# Patient Record
Sex: MALE | Race: WHITE | URBAN - METROPOLITAN AREA
[De-identification: names, ages, dates, MRNs, and addresses within clinical notes are randomized per-mention and may not be internally consistent; named-entity substitution may affect disease eponyms.]

---

## 2018-05-10 ENCOUNTER — TELEPHONE (OUTPATIENT)
Dept: FAMILY MEDICINE CLINIC | Facility: CLINIC | Age: 28
End: 2018-05-10

## 2018-05-10 NOTE — TELEPHONE ENCOUNTER
Left detailed message on pt's cell that per Dr. Mary Vazquez he needs to schedule an appt for a physical.

## 2018-05-10 NOTE — TELEPHONE ENCOUNTER
Pt states that he is working on getting approved for bariatric surgery but needs a letter stating that he has no medical contraindications and that he can participate in an exercise program. Pt was informed that it has been years since he was seen and woul

## 2018-05-17 ENCOUNTER — OFFICE VISIT (OUTPATIENT)
Dept: FAMILY MEDICINE CLINIC | Facility: CLINIC | Age: 28
End: 2018-05-17

## 2018-05-17 VITALS
DIASTOLIC BLOOD PRESSURE: 78 MMHG | HEART RATE: 88 BPM | HEIGHT: 76 IN | TEMPERATURE: 99 F | SYSTOLIC BLOOD PRESSURE: 130 MMHG | BODY MASS INDEX: 38.36 KG/M2 | WEIGHT: 315 LBS

## 2018-05-17 DIAGNOSIS — E66.01 CLASS 3 SEVERE OBESITY DUE TO EXCESS CALORIES WITHOUT SERIOUS COMORBIDITY WITH BODY MASS INDEX (BMI) OF 60.0 TO 69.9 IN ADULT (HCC): ICD-10-CM

## 2018-05-17 DIAGNOSIS — Z00.00 HEALTH CARE MAINTENANCE: Primary | ICD-10-CM

## 2018-05-17 PROCEDURE — 99385 PREV VISIT NEW AGE 18-39: CPT | Performed by: FAMILY MEDICINE

## 2018-05-17 RX ORDER — ERGOCALCIFEROL 1.25 MG/1
1 CAPSULE ORAL WEEKLY
COMMUNITY
Start: 2018-04-23

## 2018-05-17 NOTE — PATIENT INSTRUCTIONS
Clear to begin cardio activities  Continue to work with dietician and proceed with weight loss surgery  Await sleep eval, may need CPAP  Will need upper endoscopy

## 2018-05-17 NOTE — PROGRESS NOTES
[facility] [place]  PROGRESS NOTE  Chief Complaint:   Patient presents with:  Physical      HPI:   This is a 24year old male coming in for to reestablish care and to get clearance to begin exercise program as he is finally is seriously committe • Obesity      Past Surgical History:  No date: ADENOIDECTOMY  11/01/2011: OTHER SURGICAL HISTORY Right      Comment: drainage infect right  prepatellarbursa  No date: TONSILLECTOMY  Social History:  Smoking status: Never Smoker Size: thigh)   Pulse 88   Temp 98.9 °F (37.2 °C) (Tympanic)   Ht 76\"   Wt (!) 560 lb   BMI 68.17 kg/m²  Estimated body mass index is 68.17 kg/m² as calculated from the following:    Height as of this encounter: 76\".     Weight as of this encounter: 560 lb through bariatric surgery. Patient seems highly motivated to think is a very good candidate for the surgery. Of note his mother had the surgery success is kept off for more than 100 pounds from where she was at.   Patient is Ba had extensive lab testin

## 2018-06-27 ENCOUNTER — TELEPHONE (OUTPATIENT)
Dept: FAMILY MEDICINE CLINIC | Facility: CLINIC | Age: 28
End: 2018-06-27

## 2018-06-27 DIAGNOSIS — E66.01 CLASS 3 SEVERE OBESITY DUE TO EXCESS CALORIES WITHOUT SERIOUS COMORBIDITY WITH BODY MASS INDEX (BMI) OF 60.0 TO 69.9 IN ADULT (HCC): Primary | ICD-10-CM

## 2018-06-27 NOTE — TELEPHONE ENCOUNTER
Pt notified of referral. Pt's mother gave me the [facility] Sleep Lab fax number she received. Pt's mother states insurance will cover sleep study. Pre Cert sent. Referral faxed to [phone].

## 2018-06-27 NOTE — TELEPHONE ENCOUNTER
Spoke with pt's mother Jasmyn. She states Noemi Oneil was seen recently seen by Dr. Barrera Maloney (5/17/18 for physical) and he is aware Noemi Oneil is in the process of having bariatric services done at [address] [place] [address].  Pt needs to have a sleep study done before he can continue wi

## 2018-06-28 ENCOUNTER — TELEPHONE (OUTPATIENT)
Dept: FAMILY MEDICINE CLINIC | Facility: CLINIC | Age: 28
End: 2018-06-28

## 2018-06-28 DIAGNOSIS — G47.10 HYPERSOMNIA: ICD-10-CM

## 2018-06-28 DIAGNOSIS — E66.01 CLASS 3 SEVERE OBESITY DUE TO EXCESS CALORIES WITHOUT SERIOUS COMORBIDITY WITH BODY MASS INDEX (BMI) OF 60.0 TO 69.9 IN ADULT (HCC): Primary | ICD-10-CM

## 2018-06-28 NOTE — TELEPHONE ENCOUNTER
[address] called stating that the order that was sent to them for a sleep study doesn't specifically say that a sleep study is what's ordered. She states that she needs a new order that notes the type of testing that is ordered. Please advise.

## 2018-08-03 ENCOUNTER — SLEEP STUDY (OUTPATIENT)
Dept: FAMILY MEDICINE CLINIC | Facility: CLINIC | Age: 28
End: 2018-08-03
Payer: COMMERCIAL

## 2018-08-03 DIAGNOSIS — G47.33 OBSTRUCTIVE SLEEP APNEA: Primary | ICD-10-CM

## 2018-08-03 PROCEDURE — 95811 POLYSOM 6/>YRS CPAP 4/> PARM: CPT | Performed by: FAMILY MEDICINE

## 2018-09-05 ENCOUNTER — TELEPHONE (OUTPATIENT)
Dept: FAMILY MEDICINE CLINIC | Facility: CLINIC | Age: 28
End: 2018-09-05

## 2018-09-05 NOTE — TELEPHONE ENCOUNTER
needs a copy of his sleep study faxed to sleep dr at [facility], Dr Edith Stanton   [phone]-  he called [facility] sleep lab and they told him his pcp office should have results but there is nothing in EMR- stdy done in late July , early August

## 2018-09-06 NOTE — TELEPHONE ENCOUNTER
Patient states Dr. Mayra Arellano ordered the sleep study because he needed to have this done prior to having Bariatric surgery. States the results should have been faxed to our office.   Informed patient that I will contact [facility] sleep lab to have the results faxe

## 2018-09-24 ENCOUNTER — TELEPHONE (OUTPATIENT)
Dept: FAMILY MEDICINE CLINIC | Facility: CLINIC | Age: 28
End: 2018-09-24

## 2018-09-24 NOTE — TELEPHONE ENCOUNTER
Patient states back in Mid May he saw Dr. Katey Young because he was starting the process for having Bariatric surgery.   States Dr. Katey Young was going to write a letter stating he is medically cleared for exercise and surgery but he hasn't heard anything back r

## 2018-09-28 ENCOUNTER — TELEPHONE (OUTPATIENT)
Dept: FAMILY MEDICINE CLINIC | Facility: CLINIC | Age: 28
End: 2018-09-28

## 2018-09-28 DIAGNOSIS — G47.33 OSA (OBSTRUCTIVE SLEEP APNEA): Primary | ICD-10-CM

## 2018-09-28 NOTE — TELEPHONE ENCOUNTER
I have not seen any results from sleep study.    Notes from endoscopy were received  Please check with sleep lab

## 2018-09-28 NOTE — TELEPHONE ENCOUNTER
Called to discus sleep study. Discussed pt has sleep apnea. [facility]. Recommend to follow up in 2 weeks after using cpap.

## 2020-11-20 ENCOUNTER — TELEPHONE (OUTPATIENT)
Dept: FAMILY MEDICINE CLINIC | Facility: CLINIC | Age: 30
End: 2020-11-20

## 2020-12-22 ENCOUNTER — TELEPHONE (OUTPATIENT)
Dept: FAMILY MEDICINE CLINIC | Facility: CLINIC | Age: 30
End: 2020-12-22

## 2020-12-22 NOTE — TELEPHONE ENCOUNTER
I left a voicemail message letting him know that he no showed for his appointment today and to call the office if he needs to reschedule.   I  Also noted in the message that he will be changed no show fee for missing the appointment

## (undated) NOTE — LETTER
9/27/2018              Wisam Reeves        [address] [place] [address]         To Whom It May Concern,    Noemi Oneil was seen on May 17, 2018 for evaluation for proceeding with bariatric surgery.   Noemi Oneil has struggled with his weight his entir

## (undated) NOTE — LETTER
9/27/2018              Wisam [facility]andra [facility] [address]        [place] [id number]         To Whom It May Concern,     Mc Lao was seen on May 17, 2018 for evaluation for proceeding with bariatric surgery.   Iron struggle with weight his entire life but